# Patient Record
Sex: FEMALE | Race: WHITE
[De-identification: names, ages, dates, MRNs, and addresses within clinical notes are randomized per-mention and may not be internally consistent; named-entity substitution may affect disease eponyms.]

---

## 2017-02-19 ENCOUNTER — HOSPITAL ENCOUNTER (EMERGENCY)
Dept: HOSPITAL 53 - M ED | Age: 22
Discharge: HOME | End: 2017-02-19
Payer: OTHER GOVERNMENT

## 2017-02-19 DIAGNOSIS — F17.200: ICD-10-CM

## 2017-02-19 DIAGNOSIS — R19.7: ICD-10-CM

## 2017-02-19 DIAGNOSIS — Z79.899: ICD-10-CM

## 2017-02-19 DIAGNOSIS — Z79.51: ICD-10-CM

## 2017-02-19 DIAGNOSIS — R11.2: Primary | ICD-10-CM

## 2017-02-19 DIAGNOSIS — J45.909: ICD-10-CM

## 2017-02-19 DIAGNOSIS — G43.909: ICD-10-CM

## 2017-02-19 LAB
ALBUMIN SERPL BCG-MCNC: 3.9 GM/DL (ref 3.2–5.2)
ALBUMIN/GLOB SERPL: 1 {RATIO} (ref 1–1.93)
ALP SERPL-CCNC: 121 U/L (ref 45–117)
ALT SERPL W P-5'-P-CCNC: 26 U/L (ref 12–78)
AMYLASE SERPL-CCNC: 39 U/L (ref 25–115)
ANION GAP SERPL CALC-SCNC: 12 MEQ/L (ref 8–16)
AST SERPL-CCNC: 13 U/L (ref 15–37)
BILIRUB CONJ SERPL-MCNC: 0.1 MG/DL (ref 0–0.2)
BILIRUB SERPL-MCNC: 0.5 MG/DL (ref 0.2–1)
BUN SERPL-MCNC: 16 MG/DL (ref 7–18)
CALCIUM SERPL-MCNC: 9.4 MG/DL (ref 8.5–10.1)
CHLORIDE SERPL-SCNC: 104 MEQ/L (ref 98–107)
CO2 SERPL-SCNC: 25 MEQ/L (ref 21–32)
CONTROL LINE HCG: (no result)
CREAT SERPL-MCNC: 0.92 MG/DL (ref 0.55–1.02)
ERYTHROCYTE [DISTWIDTH] IN BLOOD BY AUTOMATED COUNT: 14.7 % (ref 11.5–14.5)
GLUCOSE SERPL-MCNC: 154 MG/DL (ref 70–105)
MCH RBC QN AUTO: 27 PG (ref 27–33)
MCHC RBC AUTO-ENTMCNC: 32.9 G/DL (ref 32–36.5)
MCV RBC AUTO: 81.9 FL (ref 80–96)
NEUTS BAND NFR BLD: 2 % (ref ?–11)
PLATELET # BLD AUTO: 249 K/MM3 (ref 150–450)
POTASSIUM SERPL-SCNC: 4 MEQ/L (ref 3.5–5.1)
PROT SERPL-MCNC: 7.8 GM/DL (ref 6.4–8.2)
RBC MORPH BLD: NORMAL
SODIUM SERPL-SCNC: 141 MEQ/L (ref 136–145)
WBC # BLD AUTO: 18.2 K/MM3 (ref 4–10)

## 2017-02-19 PROCEDURE — 83690 ASSAY OF LIPASE: CPT

## 2017-02-19 PROCEDURE — 96361 HYDRATE IV INFUSION ADD-ON: CPT

## 2017-02-19 PROCEDURE — 96374 THER/PROPH/DIAG INJ IV PUSH: CPT

## 2017-02-19 PROCEDURE — 36415 COLL VENOUS BLD VENIPUNCTURE: CPT

## 2017-02-19 PROCEDURE — 82150 ASSAY OF AMYLASE: CPT

## 2017-02-19 PROCEDURE — 84703 CHORIONIC GONADOTROPIN ASSAY: CPT

## 2017-02-19 PROCEDURE — 80076 HEPATIC FUNCTION PANEL: CPT

## 2017-02-19 PROCEDURE — 99284 EMERGENCY DEPT VISIT MOD MDM: CPT

## 2017-02-19 PROCEDURE — 80048 BASIC METABOLIC PNL TOTAL CA: CPT

## 2017-02-19 PROCEDURE — 85025 COMPLETE CBC W/AUTO DIFF WBC: CPT

## 2017-02-19 NOTE — EDDOCDS
Nurse's Notes                                                                                     

[facility]                                                                         

Name: Alejandra Kapadia                                                                                  

Age: 21 yrs                                                                                       

Sex: Female                                                                                       

: 1995                                                                                   

MRN: [id number]                                                                                     

Arrival Date: 2017                                                                          

Time: 03:05                                                                                       

Account#: [id number]                                                                              

Bed I2 / M2                                                                                       

Private MD:                                                                                       

Diagnosis: Nausea and vomiting;Diarrhea, unspecified                                              

                                                                                                  

Presentation:                                                                                     

                                                                                             

03:19 Presenting complaint: Patient states: she has had vomiting and diarrhea since 2100 last cz  

      night. Adult Sepsis Screening: The patient does not have new or worsening altered           

      mentation. Patient's respiratory rate is less than 22. Systolic blood pressure is           

      greater than 100. Patient has a qSOFA score of 0- Negative Sepsis Screen.                   

      Suicide/Homicide risk assessment- the patient denies having any suicidal and/or             

      homicidal ideations and does not present with any other emotional, behavioral or mental     

      health complaints.  Status: Patient is not a  or              

      dependent. Transition of care: patient was not received from another setting of care.       

03:19 Acuity: DARLENE Level 3                                                                     cz  

03:19 Method Of Arrival: Wheelchair                                                           cz  

                                                                                                  

Triage Assessment:                                                                                

03:21 General: Appears uncomfortable. Pain: Location: abdomen Pain currently is 4 out of 10   cz  

      on a pain scale. At worst was 8 out of 10 on a pain scale. HIV screening NA for this        

      visit Offered previously.                                                                   

                                                                                                  

OB/GYN:                                                                                           

03:21 LMP 2017                                                                           cz  

                                                                                                  

Historical:                                                                                       

- Allergies: No known drug Allergies;                                                             

- Home Meds:                                                                                      

1. Advair Diskus 250-50 mcg/dose Inhl dsdv 1 puff 2 times per day                               

2. Albuterol Inhl 2 puffs every 4 hours as needed                                               

3. fluticasone nasal spray 2 sprays each nostril daily                                          

4. loratadine 10 mg Oral tab 1 tab once daily                                                   

5. omeprazole 20 mg Oral cpDR 1 cap once daily                                                  

6. Singulair 10 mg Oral tab 1 tab once daily                                                    

- PMHx: Asthma; Migraine Headaches; Seasonal Allergies;                                           

- PSHx: wisdom teeth extraction;                                                                  

- Social history: Smoking status: Patient uses tobacco products, light tobacco smoker.            

No barriers to communication noted, The patient speaks fluent English, Speaks                   

appropriately for age.                                                                          

- Family history: Significant other has/had recent gastrointestinal symptoms.                     

- : The pt / caregiver states he / she is not on anticoagulants. Home medication list             

is obtained from the patient.                                                                   

- Exposure Risk Screening:: None identified.                                                      

                                                                                                  

                                                                                                  

Screenin:25 Screening information is obtained from the patient. Fall risk: No risks identified.     dls 

      Assistance ADL's: requires no assistance with activities of daily living. Abuse/DV          

      Screen: The patient / caregiver reports he/she is: not in a situation that causes fear,     

      pain or injury. Nutritional screening: No deficits noted. Advance Directives:               

      Currently, there is no health care proxy. There is no active DNR order. There is no         

      living will. There is no Power of . Advance directive information has not           

      previously been placed in an [facility] medical record. home support is adequate.                  

                                                                                                  

Assessment:                                                                                       

05:23 General: Appears distressed, uncomfortable, well developed, well nourished, Behavior is dls 

      cooperative. Neurological: No deficits noted. EENT: No deficits noted. Cardiovascular:      

      No deficits noted. Respiratory: No deficits noted. GI: Abdomen is non- distended Bowel      

      sounds present X 4 quads. Abd is soft X 4 quads Reports diarrhea, lower abdominal pain,     

      nausea, vomiting. : No deficits noted. Derm: No deficits noted. Musculoskeletal: No       

      deficits noted.                                                                             

                                                                                                  

Vital Signs:                                                                                      

03:21  / 77; Pulse 133; Resp 16; Temp 98.5(TE); Pulse Ox 98% on R/A; Weight 90.72 kg;   cz  

      Height 5 ft. 8 in. (172.72 cm);                                                             

06:30  / 69; Pulse 106; Resp 18; Temp 97.8(O); Pulse Ox 99% on R/A; Pain 0/10;          daphne 

03:21 Body Mass Index 30.41 (90.72 kg, 172.72 cm)                                               

                                                                                                  

Vitals:                                                                                           

03:21 Log In Time: 2017 at 03:05.                                                  

                                                                                                  

ED Course:                                                                                        

03:07 Patient visited by Naty Schrader.                                                      gjb 

03:07 Patient moved to Waiting                                                                gjb 

03:20 Triage Initiated                                                                        cz  

03:25 Patient moved to Pre RCE                                                                cz  

05:03 Patient moved to I2 / M2                                                                cln 

05:25 The patient / caregiver is instructed regarding the plan of care and ED course.         dls 

      Accompanied by Significant Other, Patient has correct armband on for positive               

      identification. Placed in gown. Bed in low position. Call light in reach.                   

05:25 Inserted saline lock: 20 gauge in right antecubital area and blood collected. The       dls 

      patient tolerated the procedure well. No procedures done that require assistance. Labs      

      drawn. (by ED staff).                                                                       

05:31 Patient visited by Jessy Campo RN.                                                    dls 

05:48 PLATELET ESTIMATE Sent.                                                                 dls 

05:48 DIFFERENTIAL NO CHARGE Sent.                                                            dls 

06:19 [facility] Payment Agreement was scanned into [employer] and attached to record.               pm4 

06:30 Patient visited by Linn Srinivasan PCA.                                                 daphne 

06:59 Jonathan Ortega PA is PHCP.                                                         btw 

06:59 Victor Hugo Morataya DO is Attending Physician.                                            btw 

06:59 Patient visited by Jonathan Ortega PA.                                              btw 

07:14 Discontinued IV lock intact, bleeding controlled, pressure dressing applied, No         dls 

      redness/swelling at site.                                                                   

                                                                                                  

Administered Medications:                                                                         

05:21 Drug: NS 0.9% 1000 ml [sodium chloride 0.9 % intravenous solution] Route: IV; Rate:     dls 

      bolus; Site: right antecubital;                                                             

07:13 Follow up: IV Status: Completed infusion                                                dls 

05:22 Drug: Ondansetron 4 mg [ondansetron HCl 2 mg/mL intravenous solution (2 mL)] Route:     dls 

      IVP; Site: right antecubital;                                                               

07:13 Follow up: Response: Nausea is decreased                                                dls 

                                                                                                  

                                                                                                  

Order Results:                                                                                    

Lab Order: Amylase; SPEC'M 17 05:16                                                         

      Test: AMYLASE; Value: 39; Range: ; Units: U/L; Status: F                              

Lab Order: Basic Metabolic Profile; SPEC'M 17 05:16                                         

      Test: GLUCOSE, FASTING; Value: 154; Range: ; Abnormal: Above high normal; Units:      

      MG/DL; Status: F                                                                            

      Test: BLOOD UREA NITROGEN; Value: 16; Range: 7-18; Units: MG/DL; Status: F                  

      Test: CREATININE FOR GFR; Value: 0.92; Range: 0.55-1.02; Units: MG/DL; Status: F            

      Test: SODIUM LEVEL; Value: 141; Range: 136-145; Units: MEQ/L; Status: F                     

      Test: POTASSIUM SERUM; Value: 4.0; Range: 3.5-5.1; Units: MEQ/L; Status: F                  

      Test: CHLORIDE LEVEL; Value: 104; Range: ; Units: MEQ/L; Status: F                    

      Test: CARBON DIOXIDE LEVEL; Value: 25; Range: 21-32; Units: MEQ/L; Status: F                

      Test: ANION GAP; Value: 12; Range: 8-16; Units: MEQ/L; Status: F                            

      Test: CALCIUM LEVEL; Value: 9.4; Range: 8.5-10.1; Units: MG/DL; Status: F                   

Lab Order: CBC with Diff; SPEC'M 17 05:16                                                   

      Test: WHITE BLOOD COUNT; Value: 18.2; Range: 4.0-10.0; Abnormal: Above high normal;         

      Units: K/mm3; Status: F                                                                     

      Test: RED BLOOD COUNT; Value: 5.95; Range: 4.00-5.40; Abnormal: Above high normal;          

      Units: M/mm3; Status: F                                                                     

      Test: HEMOGLOBIN; Value: 16.1; Range: 12.0-16.0; Abnormal: Above high normal; Units:        

      g/dl; Status: F                                                                             

      Test: HEMATOCRIT; Value: 48.7; Range: 36.0-47.0; Abnormal: Above high normal; Units: %;     

      Status: F                                                                                   

      Test: MEAN CORPUSCULAR VOLUME; Value: 81.9; Range: 80.0-96.0; Units: fl; Status: F          

      Test: MEAN CORPUSCULAR HEMOGLOBIN; Value: 27.0; Range: 27.0-33.0; Units: pg; Status: F      

      Test: MEAN CORPUSCULAR HGB CONC; Value: 32.9; Range: 32.0-36.5; Units: g/dl; Status: F      

      Test: RED CELL DISTRIBUTION WIDTH; Value: 14.7; Range: 11.5-14.5; Abnormal: Above high      

      normal; Units: %; Status: F                                                                 

      Test: PLATELET COUNT, AUTOMATED; Value: 249; Range: 150-450; Units: k/mm3; Status: F        

      Test: NEUTROPHILS; Value: 92; Range: 35-75; Abnormal: Above high normal; Units: %;          

      Status: F                                                                                   

      Test: BANDS; Value: 2; Range: < 11; Units: %; Status: F                                     

      Test: LYMPHOCYTES; Value: 1; Range: 16-52; Abnormal: Below low normal; Units: %;            

      Status: F                                                                                   

      Test: MONOCYTES; Value: 5; Range: 0-8; Units: %; Status: F                                  

      Test: RBC MORPHOLOGY; Value: NORMAL; Status: F                                              

Lab Order: HCG,Serum Qualitative; SPEC'M 17 05:16                                           

      Test: HCG, SERUM QUALITATIVE; Value: NEGATIVE; Range: NEGATIVE; Status: F                   

Lab Order: Lipase; SPEC'M 17 05:16                                                          

      Test: LIPASE; Value: 78; Range: ; Units: U/L; Status: F                               

Lab Order: Liver Profile; SPEC'M 17 05:16                                                   

      Test: AST/SGOT; Value: 13; Range: 15-37; Abnormal: Below low normal; Units: U/L;            

      Status: F                                                                                   

      Test: ALT/SGPT; Value: 26; Range: 12-78; Units: U/L; Status: F                              

      Test: ALKALINE PHOSPHATASE; Value: 121; Range: ; Abnormal: Above high normal;         

      Units: U/L; Status: F                                                                       

      Test: BILIRUBIN,TOTAL; Value: 0.5; Range: 0.2-1.0; Units: MG/DL; Status: F                  

      Test: BILIRUBIN,DIRECT; Value: 0.1; Range: 0.0-0.2; Units: MG/DL; Status: F                 

      Test: TOTAL PROTEIN; Value: 7.8; Range: 6.4-8.2; Units: GM/DL; Status: F                    

      Test: ALBUMIN; Value: 3.9; Range: 3.2-5.2; Units: GM/DL; Status: F                          

      Test: ALBUMIN/GLOBULIN RATIO; Value: 1.00; Range: 1.00-1.93; Status: F                      

Lab Order: PLATELET ESTIMATE; SPECM 17 05:16                                               

      Test: PLATELET ESTIMATE; Value: NORMAL; Range: NORMAL; Status: F                            

                                                                                                  

Outcome:                                                                                          

07:05 Discharge ordered by Provider.                                                          btw 

07:14 Discharge Assessment: Patient awake, alert and oriented x 3. No cognitive and/or        dls 

      functional deficits noted. Patient verbalized understanding of disposition                  

      instructions. patient administered narcotics - no. The following High Risk Discharge        

      criteria are identified: None. Discharged to home ambulatory, with significant other.       

      Condition: stable Condition: improved. Discharge instructions given to patient,             

      Instructed on discharge instructions, follow up and referral plans. medication usage,       

      Demonstrated understanding of instructions, medications, Pt was receptive of discharge      

      instructions/ teaching. Prescriptions given X 1. No special radiology studies were          

      completed. Property sent home with patient.                                                 

07:15 Patient left the ED.                                                                    dls 

                                                                                                  

Signatures:                                                                                       

Jessy Campo, RN                        RN   dls                                                  

Leon Stephen, CHRISTEN                      RN   cz                                                   

Jonathan Ortega PA PA   btw                                                  

Zarina, Linn, PCA                     PCA  Naty Peter, Rachele, PCA                   PCA  cln                                                  

Kody Erazo, Reg                     Reg  pm4                                                  

                                                                                                  

**************************************************************************************************

MTDD

## 2017-02-19 NOTE — EDDOCDS
Physician Documentation                                                                           

[facility]                                                                         

Name: Alejandra Kapadia                                                                                  

Age: 21 yrs                                                                                       

Sex: Female                                                                                       

: 1995                                                                                   

MRN: [id number]                                                                                     

Arrival Date: 2017                                                                          

Time: 03:05                                                                                       

Account#: [id number]                                                                              

Bed I2 / M2                                                                                       

Private MD:                                                                                       

Disposition:                                                                                      

17 07:05 Discharged to Home/Self Care. Impression: Nausea and vomiting, Diarrhea,           

unspecified.                                                                                    

- Condition is Stable.                                                                            

- Discharge Instructions: Viral Gastroenteritis, Easy-to-Read.                                    

- Prescriptions for ZOFRAN ODT 4 mg - dissolve 1 tablet by ORAL route 4 times per day             

As needed do not chew, do not swallow whole; 10 tablet.                                         

- Medication Reconciliation, Local Pharmacy Hours form.                                           

- Follow up: Private Physician; When: Call to arrange an appointment; Reason: Further             

diagnostic work-up, Recheck today's complaints, Continuance of care.                            

- Problem is new.                                                                                 

- Symptoms are unchanged.                                                                         

                                                                                                  

                                                                                                  

                                                                                                  

Historical:                                                                                       

- Allergies: No known drug Allergies;                                                             

- Home Meds:                                                                                      

1. Advair Diskus 250-50 mcg/dose Inhl dsdv 1 puff 2 times per day                               

2. Albuterol Inhl 2 puffs every 4 hours as needed                                               

3. fluticasone nasal spray 2 sprays each nostril daily                                          

4. loratadine 10 mg Oral tab 1 tab once daily                                                   

5. omeprazole 20 mg Oral cpDR 1 cap once daily                                                  

6. Singulair 10 mg Oral tab 1 tab once daily                                                    

- PMHx: Asthma; Migraine Headaches; Seasonal Allergies;                                           

- PSHx: wisdom teeth extraction;                                                                  

- Social history: Smoking status: Patient uses tobacco products, light tobacco smoker.            

No barriers to communication noted, The patient speaks fluent English, Speaks                   

appropriately for age.                                                                          

- Family history: Significant other has/had recent gastrointestinal symptoms.                     

- : The pt / caregiver states he / she is not on anticoagulants. Home medication list             

is obtained from the patient.                                                                   

- Exposure Risk Screening:: None identified.                                                      

                                                                                                  

                                                                                                  

OB/GYN:                                                                                           

                                                                                             

03:21 LMP 2017                                                                           cz  

                                                                                                  

Vital Signs:                                                                                      

03:21  / 77; Pulse 133; Resp 16; Temp 98.5(TE); Pulse Ox 98% on R/A; Weight 90.72 kg /  cz  

      200 lbs; Height 5 ft. 8 in. (172.72 cm);                                                    

06:30  / 69; Pulse 106; Resp 18; Temp 97.8(O); Pulse Ox 99% on R/A; Pain 0/10;          daphne 

03:21 Body Mass Index 30.41 (90.72 kg, 172.72 cm)                                             cz  

                                                                                                  

MDM:                                                                                              

05:04 NS 0.9% 1000 ml IV at bolus once ordered.                                               mm11

05:04 IV Saline Lock ordered.                                                                 mm11

05:04 Undress patient appropriately for examination ordered.                                  mm11

05:05 Amylase Ordered.                                                                        EDMS

05:05 Basic Metabolic Profile Ordered.                                                        EDMS

05:05 CBC with Diff Ordered.                                                                  EDMS

05:05 HCG,Serum Qualitative Ordered.                                                          EDMS

05:05 Lipase Ordered.                                                                         EDMS

05:05 Liver Profile Ordered.                                                                  EDMS

05:05 NOTHING BY MOUTH+DIET ordered.                                                          EDMS

05:22 Ondansetron 4 mg IVP once ordered.                                                      dls 

05:47 DIFFERENTIAL NO CHARGE Ordered.                                                         EDMS

05:47 PLATELET ESTIMATE Ordered.                                                              EDMS

06:19 [facility] Payment Agreement was scanned into [employer] and attached to record.               pm4 

06:59 Financial registration complete.                                                        pm4 

                                                                                                  

Administered Medications:                                                                         

05:21 Drug: NS 0.9% 1000 ml [sodium chloride 0.9 % intravenous solution] Route: IV; Rate:     dls 

      bolus; Site: right antecubital;                                                             

07:13 Follow up: IV Status: Completed infusion                                                dls 

05:22 Drug: Ondansetron 4 mg [ondansetron HCl 2 mg/mL intravenous solution (2 mL)] Route:     dls 

      IVP; Site: right antecubital;                                                               

07:13 Follow up: Response: Nausea is decreased                                                dls 

                                                                                                  

                                                                                                  

Signatures:                                                                                       

Dispatcher [employer]                           EDJessy Decker RN RN dls Zecher, Calvin, RN RN cz Maynard, Matthew, DO                    DO   mm11                                                 

Jonathan Ortega PA                  PA   btw                                                  

Kody Erazo, Reg                     Reg  pm4                                                  

                                                                                                  

The chart was reviewed and I authenticate all verbal orders and agree with the evaluation and 
treatment provided.Attachments:

06:19 [facility] Payment Agreement                                                                pm4 

                                                                                                  

**************************************************************************************************

[facility]

## 2017-02-21 NOTE — EDDOCDS
Physician Documentation                                                                           

[facility]                                                                         

Name: Alejandra Kapadia                                                                                  

Age: 21 yrs                                                                                       

Sex: Female                                                                                       

: 1995                                                                                   

MRN: [id number]                                                                                     

Arrival Date: 2017                                                                          

Time: 03:05                                                                                       

Account#: [id number]                                                                              

Bed I2 / M2                                                                                       

Private MD:                                                                                       

Disposition:                                                                                      

17 07:05 Discharged to Home/Self Care. Impression: Nausea and vomiting, Diarrhea,           

unspecified.                                                                                    

- Condition is Stable.                                                                            

- Discharge Instructions: Viral Gastroenteritis, Easy-to-Read.                                    

- Prescriptions for ZOFRAN ODT 4 mg - dissolve 1 tablet by ORAL route 4 times per day             

As needed do not chew, do not swallow whole; 10 tablet.                                         

- Medication Reconciliation, Local Pharmacy Hours form.                                           

- Follow up: Private Physician; When: Call to arrange an appointment; Reason: Further             

diagnostic work-up, Recheck today's complaints, Continuance of care.                            

- Problem is new.                                                                                 

- Symptoms are unchanged.                                                                         

                                                                                                  

                                                                                                  

                                                                                                  

Historical:                                                                                       

- Allergies: No known drug Allergies;                                                             

- Home Meds:                                                                                      

1. Advair Diskus 250-50 mcg/dose Inhl dsdv 1 puff 2 times per day                               

2. Albuterol Inhl 2 puffs every 4 hours as needed                                               

3. fluticasone nasal spray 2 sprays each nostril daily                                          

4. loratadine 10 mg Oral tab 1 tab once daily                                                   

5. omeprazole 20 mg Oral cpDR 1 cap once daily                                                  

6. Singulair 10 mg Oral tab 1 tab once daily                                                    

- PMHx: Asthma; Migraine Headaches; Seasonal Allergies;                                           

- PSHx: wisdom teeth extraction;                                                                  

- Social history: Smoking status: Patient uses tobacco products, light tobacco smoker.            

No barriers to communication noted, The patient speaks fluent English, Speaks                   

appropriately for age.                                                                          

- Family history: Significant other has/had recent gastrointestinal symptoms.                     

- : The pt / caregiver states he / she is not on anticoagulants. Home medication list             

is obtained from the patient.                                                                   

- Exposure Risk Screening:: None identified.                                                      

                                                                                                  

                                                                                                  

OB/GYN:                                                                                           

                                                                                             

03:21 LMP 2017                                                                           cz  

                                                                                                  

Vital Signs:                                                                                      

03:21  / 77; Pulse 133; Resp 16; Temp 98.5(TE); Pulse Ox 98% on R/A; Weight 90.72 kg /  cz  

      200 lbs; Height 5 ft. 8 in. (172.72 cm);                                                    

06:30  / 69; Pulse 106; Resp 18; Temp 97.8(O); Pulse Ox 99% on R/A; Pain 0/10;          daphne 

03:21 Body Mass Index 30.41 (90.72 kg, 172.72 cm)                                             cz  

                                                                                                  

MDM:                                                                                              

05:04 NS 0.9% 1000 ml IV at bolus once ordered.                                               mm11

05:04 IV Saline Lock ordered.                                                                 mm11

05:04 Undress patient appropriately for examination ordered.                                  mm11

05:05 Amylase Ordered.                                                                        EDMS

05:05 Basic Metabolic Profile Ordered.                                                        EDMS

05:05 CBC with Diff Ordered.                                                                  EDMS

05:05 HCG,Serum Qualitative Ordered.                                                          EDMS

05:05 Lipase Ordered.                                                                         EDMS

05:05 Liver Profile Ordered.                                                                  EDMS

05:05 NOTHING BY MOUTH+DIET ordered.                                                          EDMS

05:22 Ondansetron 4 mg IVP once ordered.                                                      dls 

05:47 DIFFERENTIAL NO CHARGE Ordered.                                                         EDMS

05:47 PLATELET ESTIMATE Ordered.                                                              EDMS

06:19 [facility] Payment Agreement was scanned into [employer] and attached to record.               pm4 

06:59 Financial registration complete.                                                        pm4 

22:10 T-Sheet-- Draft Copy was scanned into [employer] and attached to record.                   klr 

                                                                                                  

Administered Medications:                                                                         

05:21 Drug: NS 0.9% 1000 ml [sodium chloride 0.9 % intravenous solution] Route: IV; Rate:     dls 

      bolus; Site: right antecubital;                                                             

07:13 Follow up: IV Status: Completed infusion                                                dls 

05:22 Drug: Ondansetron 4 mg [ondansetron HCl 2 mg/mL intravenous solution (2 mL)] Route:     dls 

      IVP; Site: right antecubital;                                                               

07:13 Follow up: Response: Nausea is decreased                                                dls 

                                                                                                  

                                                                                                  

Signatures:                                                                                       

Dispatcher [employer]                           EDJessy Decker RN RN dls Zecher, Calvin, RN RN cz Maynard, Matthew, DO                    DO   mm11                                                 

Jonathan Ortega PA PA btw Redder, Kathie                               klr                                                  

Kody Erazo, Reg                     Reg  pm4                                                  

                                                                                                  

The chart was reviewed and I authenticate all verbal orders and agree with the evaluation and 
treatment provided.Attachments:

06:19 [facility] Payment Agreement                                                                pm4 

22:10 T-Sheet-- Draft Copy                                                                    klr 

                                                                                                  

**************************************************************************************************



*** Chart Complete ***
MTDD

## 2017-02-21 NOTE — EDDOCDS
Nurse's Notes                                                                                     

[facility]                                                                         

Name: Alejandra Kapadia                                                                                  

Age: 21 yrs                                                                                       

Sex: Female                                                                                       

: 1995                                                                                   

MRN: [id number]                                                                                     

Arrival Date: 2017                                                                          

Time: 03:05                                                                                       

Account#: [id number]                                                                              

Bed I2 / M2                                                                                       

Private MD:                                                                                       

Diagnosis: Nausea and vomiting;Diarrhea, unspecified                                              

                                                                                                  

Presentation:                                                                                     

                                                                                             

03:19 Presenting complaint: Patient states: she has had vomiting and diarrhea since 2100 last cz  

      night. Adult Sepsis Screening: The patient does not have new or worsening altered           

      mentation. Patient's respiratory rate is less than 22. Systolic blood pressure is           

      greater than 100. Patient has a qSOFA score of 0- Negative Sepsis Screen.                   

      Suicide/Homicide risk assessment- the patient denies having any suicidal and/or             

      homicidal ideations and does not present with any other emotional, behavioral or mental     

      health complaints.  Status: Patient is not a  or              

      dependent. Transition of care: patient was not received from another setting of care.       

03:19 Acuity: DARLENE Level 3                                                                     cz  

03:19 Method Of Arrival: Wheelchair                                                           cz  

                                                                                                  

Triage Assessment:                                                                                

03:21 General: Appears uncomfortable. Pain: Location: abdomen Pain currently is 4 out of 10   cz  

      on a pain scale. At worst was 8 out of 10 on a pain scale. HIV screening NA for this        

      visit Offered previously.                                                                   

                                                                                                  

OB/GYN:                                                                                           

03:21 LMP 2017                                                                           cz  

                                                                                                  

Historical:                                                                                       

- Allergies: No known drug Allergies;                                                             

- Home Meds:                                                                                      

1. Advair Diskus 250-50 mcg/dose Inhl dsdv 1 puff 2 times per day                               

2. Albuterol Inhl 2 puffs every 4 hours as needed                                               

3. fluticasone nasal spray 2 sprays each nostril daily                                          

4. loratadine 10 mg Oral tab 1 tab once daily                                                   

5. omeprazole 20 mg Oral cpDR 1 cap once daily                                                  

6. Singulair 10 mg Oral tab 1 tab once daily                                                    

- PMHx: Asthma; Migraine Headaches; Seasonal Allergies;                                           

- PSHx: wisdom teeth extraction;                                                                  

- Social history: Smoking status: Patient uses tobacco products, light tobacco smoker.            

No barriers to communication noted, The patient speaks fluent English, Speaks                   

appropriately for age.                                                                          

- Family history: Significant other has/had recent gastrointestinal symptoms.                     

- : The pt / caregiver states he / she is not on anticoagulants. Home medication list             

is obtained from the patient.                                                                   

- Exposure Risk Screening:: None identified.                                                      

                                                                                                  

                                                                                                  

Screenin:25 Screening information is obtained from the patient. Fall risk: No risks identified.     dls 

      Assistance ADL's: requires no assistance with activities of daily living. Abuse/DV          

      Screen: The patient / caregiver reports he/she is: not in a situation that causes fear,     

      pain or injury. Nutritional screening: No deficits noted. Advance Directives:               

      Currently, there is no health care proxy. There is no active DNR order. There is no         

      living will. There is no Power of . Advance directive information has not           

      previously been placed in an [facility] medical record. home support is adequate.                  

                                                                                                  

Assessment:                                                                                       

05:23 General: Appears distressed, uncomfortable, well developed, well nourished, Behavior is dls 

      cooperative. Neurological: No deficits noted. EENT: No deficits noted. Cardiovascular:      

      No deficits noted. Respiratory: No deficits noted. GI: Abdomen is non- distended Bowel      

      sounds present X 4 quads. Abd is soft X 4 quads Reports diarrhea, lower abdominal pain,     

      nausea, vomiting. : No deficits noted. Derm: No deficits noted. Musculoskeletal: No       

      deficits noted.                                                                             

                                                                                                  

Vital Signs:                                                                                      

03:21  / 77; Pulse 133; Resp 16; Temp 98.5(TE); Pulse Ox 98% on R/A; Weight 90.72 kg;   cz  

      Height 5 ft. 8 in. (172.72 cm);                                                             

06:30  / 69; Pulse 106; Resp 18; Temp 97.8(O); Pulse Ox 99% on R/A; Pain 0/10;          daphne 

03:21 Body Mass Index 30.41 (90.72 kg, 172.72 cm)                                               

                                                                                                  

Vitals:                                                                                           

03:21 Log In Time: 2017 at 03:05.                                                  

                                                                                                  

ED Course:                                                                                        

03:07 Patient visited by Naty Schrader.                                                      gjb 

03:07 Patient moved to Waiting                                                                gjb 

03:20 Triage Initiated                                                                        cz  

03:25 Patient moved to Pre RCE                                                                cz  

05:03 Patient moved to I2 / M2                                                                cln 

05:25 The patient / caregiver is instructed regarding the plan of care and ED course.         dls 

      Accompanied by Significant Other, Patient has correct armband on for positive               

      identification. Placed in gown. Bed in low position. Call light in reach.                   

05:25 Inserted saline lock: 20 gauge in right antecubital area and blood collected. The       dls 

      patient tolerated the procedure well. No procedures done that require assistance. Labs      

      drawn. (by ED staff).                                                                       

05:31 Patient visited by Jessy Campo RN.                                                    dls 

05:48 PLATELET ESTIMATE Sent.                                                                 dls 

05:48 DIFFERENTIAL NO CHARGE Sent.                                                            dls 

06:19 [facility] Payment Agreement was scanned into [facility] and attached to record.               pm4 

06:30 Patient visited by Linn Srinivasan PCA.                                                 daphne 

06:59 Jonathan Ortega PA is PHCP.                                                         btw 

06:59 Victor Hugo Morataya DO is Attending Physician.                                            btw 

06:59 Patient visited by Jonathan Ortega PA.                                              btw 

07:14 Discontinued IV lock intact, bleeding controlled, pressure dressing applied, No         dls 

      redness/swelling at site.                                                                   

22:10 T-Sheet-- Draft Copy was scanned into [facility] and attached to record.                   klr 

                                                                                                  

Administered Medications:                                                                         

05:21 Drug: NS 0.9% 1000 ml [sodium chloride 0.9 % intravenous solution] Route: IV; Rate:     dls 

      bolus; Site: right antecubital;                                                             

07:13 Follow up: IV Status: Completed infusion                                                dls 

05:22 Drug: Ondansetron 4 mg [ondansetron HCl 2 mg/mL intravenous solution (2 mL)] Route:     dls 

      IVP; Site: right antecubital;                                                               

07:13 Follow up: Response: Nausea is decreased                                                dls 

                                                                                                  

                                                                                                  

Order Results:                                                                                    

Lab Order: Amylase; SPEC'M 17 05:16                                                         

      Test: AMYLASE; Value: 39; Range: ; Units: U/L; Status: F                              

Lab Order: Basic Metabolic Profile; SPEC'M 17 05:16                                         

      Test: GLUCOSE, FASTING; Value: 154; Range: ; Abnormal: Above high normal; Units:      

      MG/DL; Status: F                                                                            

      Test: BLOOD UREA NITROGEN; Value: 16; Range: 7-18; Units: MG/DL; Status: F                  

      Test: CREATININE FOR GFR; Value: 0.92; Range: 0.55-1.02; Units: MG/DL; Status: F            

      Test: SODIUM LEVEL; Value: 141; Range: 136-145; Units: MEQ/L; Status: F                     

      Test: POTASSIUM SERUM; Value: 4.0; Range: 3.5-5.1; Units: MEQ/L; Status: F                  

      Test: CHLORIDE LEVEL; Value: 104; Range: ; Units: MEQ/L; Status: F                    

      Test: CARBON DIOXIDE LEVEL; Value: 25; Range: 21-32; Units: MEQ/L; Status: F                

      Test: ANION GAP; Value: 12; Range: 8-16; Units: MEQ/L; Status: F                            

      Test: CALCIUM LEVEL; Value: 9.4; Range: 8.5-10.1; Units: MG/DL; Status: F                   

Lab Order: CBC with Diff; SPEC'M 17 05:16                                                   

      Test: WHITE BLOOD COUNT; Value: 18.2; Range: 4.0-10.0; Abnormal: Above high normal;         

      Units: K/mm3; Status: F                                                                     

      Test: RED BLOOD COUNT; Value: 5.95; Range: 4.00-5.40; Abnormal: Above high normal;          

      Units: M/mm3; Status: F                                                                     

      Test: HEMOGLOBIN; Value: 16.1; Range: 12.0-16.0; Abnormal: Above high normal; Units:        

      g/dl; Status: F                                                                             

      Test: HEMATOCRIT; Value: 48.7; Range: 36.0-47.0; Abnormal: Above high normal; Units: %;     

      Status: F                                                                                   

      Test: MEAN CORPUSCULAR VOLUME; Value: 81.9; Range: 80.0-96.0; Units: fl; Status: F          

      Test: MEAN CORPUSCULAR HEMOGLOBIN; Value: 27.0; Range: 27.0-33.0; Units: pg; Status: F      

      Test: MEAN CORPUSCULAR HGB CONC; Value: 32.9; Range: 32.0-36.5; Units: g/dl; Status: F      

      Test: RED CELL DISTRIBUTION WIDTH; Value: 14.7; Range: 11.5-14.5; Abnormal: Above high      

      normal; Units: %; Status: F                                                                 

      Test: PLATELET COUNT, AUTOMATED; Value: 249; Range: 150-450; Units: k/mm3; Status: F        

      Test: NEUTROPHILS; Value: 92; Range: 35-75; Abnormal: Above high normal; Units: %;          

      Status: F                                                                                   

      Test: BANDS; Value: 2; Range: < 11; Units: %; Status: F                                     

      Test: LYMPHOCYTES; Value: 1; Range: 16-52; Abnormal: Below low normal; Units: %;            

      Status: F                                                                                   

      Test: MONOCYTES; Value: 5; Range: 0-8; Units: %; Status: F                                  

      Test: RBC MORPHOLOGY; Value: NORMAL; Status: F                                              

Lab Order: HCG,Serum Qualitative; SPEC17 05:16                                           

      Test: HCG, SERUM QUALITATIVE; Value: NEGATIVE; Range: NEGATIVE; Status: F                   

Lab Order: Lipase; SPEC 17 05:16                                                          

      Test: LIPASE; Value: 78; Range: ; Units: U/L; Status: F                               

Lab Order: Liver Profile; SPEC 17 05:16                                                   

      Test: AST/SGOT; Value: 13; Range: 15-37; Abnormal: Below low normal; Units: U/L;            

      Status: F                                                                                   

      Test: ALT/SGPT; Value: 26; Range: 12-78; Units: U/L; Status: F                              

      Test: ALKALINE PHOSPHATASE; Value: 121; Range: ; Abnormal: Above high normal;         

      Units: U/L; Status: F                                                                       

      Test: BILIRUBIN,TOTAL; Value: 0.5; Range: 0.2-1.0; Units: MG/DL; Status: F                  

      Test: BILIRUBIN,DIRECT; Value: 0.1; Range: 0.0-0.2; Units: MG/DL; Status: F                 

      Test: TOTAL PROTEIN; Value: 7.8; Range: 6.4-8.2; Units: GM/DL; Status: F                    

      Test: ALBUMIN; Value: 3.9; Range: 3.2-5.2; Units: GM/DL; Status: F                          

      Test: ALBUMIN/GLOBULIN RATIO; Value: 1.00; Range: 1.00-1.93; Status: F                      

Lab Order: PLATELET ESTIMATE; SPEC17 05:16                                               

      Test: PLATELET ESTIMATE; Value: NORMAL; Range: NORMAL; Status: F                            

                                                                                                  

Outcome:                                                                                          

07:05 Discharge ordered by Provider.                                                          btw 

07:14 Discharge Assessment: Patient awake, alert and oriented x 3. No cognitive and/or        dls 

      functional deficits noted. Patient verbalized understanding of disposition                  

      instructions. patient administered narcotics - no. The following High Risk Discharge        

      criteria are identified: None. Discharged to home ambulatory, with significant other.       

      Condition: stable Condition: improved. Discharge instructions given to patient,             

      Instructed on discharge instructions, follow up and referral plans. medication usage,       

      Demonstrated understanding of instructions, medications, Pt was receptive of discharge      

      instructions/ teaching. Prescriptions given X 1. No special radiology studies were          

      completed. Property sent home with patient.                                                 

07:15 Patient left the ED.                                                                    dls 

                                                                                                  

Signatures:                                                                                       

Jessy Campo, RN                        RN   Leon Johnson RN                      RN   Jonathan Wilson PA PA btw                                                  

Zarina, Linn, PCA                     PCA  daphne                                                  

Beck, Naty menjivarb                                                  

Daren, Rachele, PCA                   PCA  Ashly Barrios Paul, Reg                     Reg  pm4                                                  

                                                                                                  

**************************************************************************************************



*** Chart Complete ***
MTDD

## 2017-03-07 ENCOUNTER — HOSPITAL ENCOUNTER (EMERGENCY)
Dept: HOSPITAL 53 - M ED | Age: 22
Discharge: HOME | End: 2017-03-07
Payer: OTHER GOVERNMENT

## 2017-03-07 VITALS — WEIGHT: 200 LBS | HEIGHT: 68 IN | BODY MASS INDEX: 30.31 KG/M2

## 2017-03-07 VITALS — DIASTOLIC BLOOD PRESSURE: 75 MMHG | SYSTOLIC BLOOD PRESSURE: 122 MMHG

## 2017-03-07 DIAGNOSIS — R11.2: Primary | ICD-10-CM

## 2017-03-07 DIAGNOSIS — Z87.891: ICD-10-CM

## 2017-03-07 LAB
ANION GAP SERPL CALC-SCNC: 6 MEQ/L (ref 8–16)
BASOPHILS # BLD AUTO: 0 K/MM3 (ref 0–0.2)
BASOPHILS NFR BLD AUTO: 0.9 % (ref 0–1)
BUN SERPL-MCNC: 7 MG/DL (ref 7–18)
CALCIUM SERPL-MCNC: 9.1 MG/DL (ref 8.5–10.1)
CHLORIDE SERPL-SCNC: 105 MEQ/L (ref 98–107)
CO2 SERPL-SCNC: 30 MEQ/L (ref 21–32)
CONTROL LINE HCG: (no result)
CREAT SERPL-MCNC: 0.63 MG/DL (ref 0.55–1.02)
EOSINOPHIL # BLD AUTO: 0.2 K/MM3 (ref 0–0.5)
EOSINOPHIL NFR BLD AUTO: 3.9 % (ref 0–3)
ERYTHROCYTE [DISTWIDTH] IN BLOOD BY AUTOMATED COUNT: 14.7 % (ref 11.5–14.5)
GLUCOSE SERPL-MCNC: 89 MG/DL (ref 70–105)
LARGE UNSTAINED CELL #: 0.1 K/MM3 (ref 0–0.4)
LARGE UNSTAINED CELL %: 1.5 % (ref 0–4)
LYMPHOCYTES # BLD AUTO: 1.9 K/MM3 (ref 1.5–6.5)
LYMPHOCYTES NFR BLD AUTO: 35.3 % (ref 24–44)
MCH RBC QN AUTO: 26.9 PG (ref 27–33)
MCHC RBC AUTO-ENTMCNC: 32.7 G/DL (ref 32–36.5)
MCV RBC AUTO: 82.4 FL (ref 80–96)
MONOCYTES # BLD AUTO: 0.3 K/MM3 (ref 0–0.8)
MONOCYTES NFR BLD AUTO: 6.5 % (ref 0–5)
NEUTROPHILS # BLD AUTO: 2.6 K/MM3 (ref 1.8–7.7)
NEUTROPHILS NFR BLD AUTO: 51.9 % (ref 36–66)
PLATELET # BLD AUTO: 225 K/MM3 (ref 150–450)
POTASSIUM SERPL-SCNC: 4 MEQ/L (ref 3.5–5.1)
SODIUM SERPL-SCNC: 141 MEQ/L (ref 136–145)
WBC # BLD AUTO: 5 K/MM3 (ref 4–10)

## 2017-05-18 ENCOUNTER — HOSPITAL ENCOUNTER (EMERGENCY)
Dept: HOSPITAL 53 - M ED | Age: 22
Discharge: HOME | End: 2017-05-18
Payer: OTHER GOVERNMENT

## 2017-05-18 VITALS — DIASTOLIC BLOOD PRESSURE: 75 MMHG | SYSTOLIC BLOOD PRESSURE: 154 MMHG

## 2017-05-18 VITALS — HEIGHT: 68 IN | BODY MASS INDEX: 29.4 KG/M2 | WEIGHT: 194 LBS

## 2017-05-18 DIAGNOSIS — Z79.51: ICD-10-CM

## 2017-05-18 DIAGNOSIS — J45.909: ICD-10-CM

## 2017-05-18 DIAGNOSIS — N83.8: ICD-10-CM

## 2017-05-18 DIAGNOSIS — E28.2: ICD-10-CM

## 2017-05-18 DIAGNOSIS — Z79.899: ICD-10-CM

## 2017-05-18 DIAGNOSIS — N83.292: Primary | ICD-10-CM

## 2017-05-18 DIAGNOSIS — N76.0: ICD-10-CM

## 2017-05-18 DIAGNOSIS — F90.9: ICD-10-CM

## 2017-05-18 DIAGNOSIS — K21.9: ICD-10-CM

## 2017-05-18 LAB
ALBUMIN SERPL BCG-MCNC: 3.4 GM/DL (ref 3.2–5.2)
ALBUMIN/GLOB SERPL: 1 {RATIO} (ref 1–1.93)
ALP SERPL-CCNC: 89 U/L (ref 45–117)
ALT SERPL W P-5'-P-CCNC: 17 U/L (ref 12–78)
AMYLASE SERPL-CCNC: 44 U/L (ref 25–115)
ANION GAP SERPL CALC-SCNC: 5 MEQ/L (ref 8–16)
AST SERPL-CCNC: 9 U/L (ref 15–37)
BASOPHILS # BLD AUTO: 0 K/MM3 (ref 0–0.2)
BASOPHILS NFR BLD AUTO: 0.7 % (ref 0–1)
BILIRUB CONJ SERPL-MCNC: < 0.1 MG/DL (ref 0–0.2)
BILIRUB SERPL-MCNC: 0.2 MG/DL (ref 0.2–1)
BUN SERPL-MCNC: 15 MG/DL (ref 7–18)
CALCIUM SERPL-MCNC: 8.8 MG/DL (ref 8.5–10.1)
CHLORIDE SERPL-SCNC: 108 MEQ/L (ref 98–107)
CO2 SERPL-SCNC: 29 MEQ/L (ref 21–32)
CONTROL LINE HCG: (no result)
CREAT SERPL-MCNC: 0.62 MG/DL (ref 0.55–1.02)
EOSINOPHIL # BLD AUTO: 0.2 K/MM3 (ref 0–0.5)
EOSINOPHIL NFR BLD AUTO: 2.9 % (ref 0–3)
ERYTHROCYTE [DISTWIDTH] IN BLOOD BY AUTOMATED COUNT: 14.5 % (ref 11.5–14.5)
GLUCOSE SERPL-MCNC: 69 MG/DL (ref 70–105)
INTERNAL CH/GC CONTROL: (no result)
LARGE UNSTAINED CELL #: 0.1 K/MM3 (ref 0–0.4)
LARGE UNSTAINED CELL %: 1.3 % (ref 0–4)
LYMPHOCYTES # BLD AUTO: 1.7 K/MM3 (ref 1.5–6.5)
LYMPHOCYTES NFR BLD AUTO: 23 % (ref 24–44)
MCH RBC QN AUTO: 28 PG (ref 27–33)
MCHC RBC AUTO-ENTMCNC: 32.8 G/DL (ref 32–36.5)
MCV RBC AUTO: 85.1 FL (ref 80–96)
MONOCYTES # BLD AUTO: 0.5 K/MM3 (ref 0–0.8)
MONOCYTES NFR BLD AUTO: 6.6 % (ref 0–5)
NEUTROPHILS # BLD AUTO: 4.5 K/MM3 (ref 1.8–7.7)
NEUTROPHILS NFR BLD AUTO: 65.6 % (ref 36–66)
PLATELET # BLD AUTO: 200 K/MM3 (ref 150–450)
POTASSIUM SERPL-SCNC: 4.1 MEQ/L (ref 3.5–5.1)
PROT SERPL-MCNC: 6.8 GM/DL (ref 6.4–8.2)
SODIUM SERPL-SCNC: 142 MEQ/L (ref 136–145)
WBC # BLD AUTO: 6.9 K/MM3 (ref 4–10)

## 2017-05-18 PROCEDURE — 99283 EMERGENCY DEPT VISIT LOW MDM: CPT

## 2017-05-18 PROCEDURE — 76830 TRANSVAGINAL US NON-OB: CPT

## 2017-05-18 PROCEDURE — 96374 THER/PROPH/DIAG INJ IV PUSH: CPT

## 2017-05-18 PROCEDURE — 80048 BASIC METABOLIC PNL TOTAL CA: CPT

## 2017-05-18 PROCEDURE — 74177 CT ABD & PELVIS W/CONTRAST: CPT

## 2017-05-18 PROCEDURE — 85025 COMPLETE CBC W/AUTO DIFF WBC: CPT

## 2017-05-18 PROCEDURE — 84703 CHORIONIC GONADOTROPIN ASSAY: CPT

## 2017-05-18 PROCEDURE — 87491 CHLMYD TRACH DNA AMP PROBE: CPT

## 2017-05-18 PROCEDURE — 80076 HEPATIC FUNCTION PANEL: CPT

## 2017-05-18 PROCEDURE — 81001 URINALYSIS AUTO W/SCOPE: CPT

## 2017-05-18 PROCEDURE — 76856 US EXAM PELVIC COMPLETE: CPT

## 2017-05-18 PROCEDURE — 82150 ASSAY OF AMYLASE: CPT

## 2017-05-18 PROCEDURE — 96375 TX/PRO/DX INJ NEW DRUG ADDON: CPT

## 2017-05-18 PROCEDURE — 93976 VASCULAR STUDY: CPT

## 2017-05-18 PROCEDURE — 96376 TX/PRO/DX INJ SAME DRUG ADON: CPT

## 2017-05-18 PROCEDURE — 87591 N.GONORRHOEAE DNA AMP PROB: CPT

## 2017-05-18 PROCEDURE — 83690 ASSAY OF LIPASE: CPT

## 2017-05-18 PROCEDURE — 87210 SMEAR WET MOUNT SALINE/INK: CPT

## 2017-05-18 NOTE — REP
Clinical:  Right pelvic pain.

 

Technique:  Transabdominal pelvic ultrasound followed by transvaginal examination

for better evaluation of the endometrium and adnexa with color Doppler evaluation

of the ovaries.

 

Findings:

 

Bladder is unremarkable and measures 6.1 x 3.3 x 2.9 cm .

 

Normal anteverted uterus measures 7.7 x 3.7 x 4.6 cm .  The endometrial complex

measures 9 mm thickness.  No discrete uterine or endometrial abnormalities are

appreciated.

 

Bilateral ovaries are normal in appearance and vascularity without evidence for

torsion.  Right ovary measures 3.1 x 2.7 x 2.7 cm ; R I = 0.33 .  Left ovary

measures 4.3 x 3.1 x 4.5 cm with 2.7 cm hemorrhagic cyst ; R I = 0.34 .

 

Trace pelvic free fluid is nonspecific and possibly related to menstrual cycle.

No adnexal mass lesion.

 

Impression:

1.  2.7 cm left hemorrhagic cyst.

2.  Otherwise normal pelvic ultrasound.

 

 

Signed by

Antoine Guzman MD 05/18/2017 04:24 P

## 2017-05-18 NOTE — REP
CT abdomen pelvis with IV contrast, without bowel contrast:

 

Comparison is the pelvic ultrasound performed earlier today.

 

The visualized lung fields are unremarkable.

 

The hepatic parenchyma, gallbladder, pancreas and spleen are normal size and

unremarkable.

 

The adrenals, kidneys and abdominal aorta are unremarkable.

 

There is no bowel distension.  Mesentery is unremarkable.

 

Pelvis:

 

The appendix has a normal appearance.  The a uterus is unremarkable.  There is a

left adnexal cyst measuring 3.8 cm by CT.  There is a small volume of ascites.

The bladder is unremarkable.  There is no adenopathy or free fluid.

 

Impression:

 

3.8 cm left adnexal cyst.  Small volume of ascites in the pelvis.

 

Otherwise, negative CT study of the abdomen and pelvis.

 

 

Signed by

Tommy Li MD 05/18/2017 05:45 P

## 2017-05-30 ENCOUNTER — HOSPITAL ENCOUNTER (EMERGENCY)
Dept: HOSPITAL 53 - M ED | Age: 22
Discharge: HOME | End: 2017-05-30
Payer: OTHER GOVERNMENT

## 2017-05-30 VITALS — DIASTOLIC BLOOD PRESSURE: 77 MMHG | SYSTOLIC BLOOD PRESSURE: 129 MMHG

## 2017-05-30 VITALS — HEIGHT: 68 IN | BODY MASS INDEX: 27.28 KG/M2 | WEIGHT: 180 LBS

## 2017-05-30 DIAGNOSIS — Z79.899: ICD-10-CM

## 2017-05-30 DIAGNOSIS — F17.200: ICD-10-CM

## 2017-05-30 DIAGNOSIS — Z79.51: ICD-10-CM

## 2017-05-30 DIAGNOSIS — R11.2: Primary | ICD-10-CM

## 2017-05-30 LAB
ALBUMIN SERPL BCG-MCNC: 3.5 GM/DL (ref 3.2–5.2)
ALBUMIN/GLOB SERPL: 0.95 {RATIO} (ref 1–1.93)
ALP SERPL-CCNC: 84 U/L (ref 45–117)
ALT SERPL W P-5'-P-CCNC: 23 U/L (ref 12–78)
ANION GAP SERPL CALC-SCNC: 7 MEQ/L (ref 8–16)
AST SERPL-CCNC: 14 U/L (ref 15–37)
BASOPHILS # BLD AUTO: 0.1 K/MM3 (ref 0–0.2)
BASOPHILS NFR BLD AUTO: 0.7 % (ref 0–1)
BILIRUB CONJ SERPL-MCNC: < 0.1 MG/DL (ref 0–0.2)
BILIRUB SERPL-MCNC: 0.2 MG/DL (ref 0.2–1)
BUN SERPL-MCNC: 8 MG/DL (ref 7–18)
CALCIUM SERPL-MCNC: 8.4 MG/DL (ref 8.5–10.1)
CHLORIDE SERPL-SCNC: 105 MEQ/L (ref 98–107)
CO2 SERPL-SCNC: 27 MEQ/L (ref 21–32)
CONTROL LINE HCG: (no result)
CREAT SERPL-MCNC: 0.64 MG/DL (ref 0.55–1.02)
EOSINOPHIL # BLD AUTO: 0.3 K/MM3 (ref 0–0.5)
EOSINOPHIL NFR BLD AUTO: 3.6 % (ref 0–3)
ERYTHROCYTE [DISTWIDTH] IN BLOOD BY AUTOMATED COUNT: 14.5 % (ref 11.5–14.5)
GLUCOSE SERPL-MCNC: 81 MG/DL (ref 70–105)
LARGE UNSTAINED CELL #: 0.1 K/MM3 (ref 0–0.4)
LARGE UNSTAINED CELL %: 1.1 % (ref 0–4)
LYMPHOCYTES # BLD AUTO: 2.2 K/MM3 (ref 1.5–6.5)
LYMPHOCYTES NFR BLD AUTO: 24.2 % (ref 24–44)
MCH RBC QN AUTO: 28.1 PG (ref 27–33)
MCHC RBC AUTO-ENTMCNC: 33.2 G/DL (ref 32–36.5)
MCV RBC AUTO: 84.7 FL (ref 80–96)
MONOCYTES # BLD AUTO: 0.4 K/MM3 (ref 0–0.8)
MONOCYTES NFR BLD AUTO: 4.5 % (ref 0–5)
NEUTROPHILS # BLD AUTO: 5.7 K/MM3 (ref 1.8–7.7)
NEUTROPHILS NFR BLD AUTO: 66 % (ref 36–66)
PLATELET # BLD AUTO: 260 K/MM3 (ref 150–450)
POTASSIUM SERPL-SCNC: 3.8 MEQ/L (ref 3.5–5.1)
PROT SERPL-MCNC: 7.2 GM/DL (ref 6.4–8.2)
SODIUM SERPL-SCNC: 139 MEQ/L (ref 136–145)
WBC # BLD AUTO: 8.6 K/MM3 (ref 4–10)

## 2017-05-30 NOTE — REP
Clinical:  Epigastric and abdominal pain.

 

Technique:  Upright view of the chest with supine and upright views of the

abdomen and pelvis.

 

Findings:  Frontal upright view of the chest demonstrates no acute

cardiopulmonary process or free air below the diaphragm to suspect

pneumoperitoneum.  Supine and upright views of the abdomen and pelvis demonstrate

nonspecific bowel gas pattern without obstruction or perforation.  No

organomegaly.  No abnormal calcifications.  Skeletal structures normal for age.

 

Impression:

Nonspecific bowel gas pattern.

 

 

Signed by

Antoine Guzman MD 05/30/2017 09:44 P

## 2017-06-01 ENCOUNTER — HOSPITAL ENCOUNTER (EMERGENCY)
Dept: HOSPITAL 53 - M ED | Age: 22
Discharge: HOME | End: 2017-06-01
Payer: OTHER GOVERNMENT

## 2017-06-01 VITALS — HEIGHT: 68 IN | BODY MASS INDEX: 28.04 KG/M2 | WEIGHT: 185 LBS

## 2017-06-01 VITALS — DIASTOLIC BLOOD PRESSURE: 74 MMHG | SYSTOLIC BLOOD PRESSURE: 118 MMHG

## 2017-06-01 DIAGNOSIS — Z79.899: ICD-10-CM

## 2017-06-01 DIAGNOSIS — F90.9: ICD-10-CM

## 2017-06-01 DIAGNOSIS — J45.909: ICD-10-CM

## 2017-06-01 DIAGNOSIS — F17.200: ICD-10-CM

## 2017-06-01 DIAGNOSIS — F41.9: ICD-10-CM

## 2017-06-01 DIAGNOSIS — R10.2: ICD-10-CM

## 2017-06-01 DIAGNOSIS — N39.0: Primary | ICD-10-CM

## 2017-06-01 DIAGNOSIS — Z79.51: ICD-10-CM

## 2017-06-01 DIAGNOSIS — F32.9: ICD-10-CM

## 2017-06-01 LAB
ANION GAP SERPL CALC-SCNC: 7 MEQ/L (ref 8–16)
BASOPHILS # BLD AUTO: 0 K/MM3 (ref 0–0.2)
BASOPHILS NFR BLD AUTO: 0.6 % (ref 0–1)
BUN SERPL-MCNC: 8 MG/DL (ref 7–18)
CALCIUM SERPL-MCNC: 8.7 MG/DL (ref 8.5–10.1)
CHLORIDE SERPL-SCNC: 104 MEQ/L (ref 98–107)
CO2 SERPL-SCNC: 28 MEQ/L (ref 21–32)
CONTROL LINE HCG: (no result)
CREAT SERPL-MCNC: 0.72 MG/DL (ref 0.55–1.02)
EOSINOPHIL # BLD AUTO: 0.3 K/MM3 (ref 0–0.5)
EOSINOPHIL NFR BLD AUTO: 3.5 % (ref 0–3)
ERYTHROCYTE [DISTWIDTH] IN BLOOD BY AUTOMATED COUNT: 14.2 % (ref 11.5–14.5)
GLUCOSE SERPL-MCNC: 87 MG/DL (ref 70–105)
LARGE UNSTAINED CELL #: 0.1 K/MM3 (ref 0–0.4)
LARGE UNSTAINED CELL %: 1.4 % (ref 0–4)
LYMPHOCYTES # BLD AUTO: 2.4 K/MM3 (ref 1.5–6.5)
LYMPHOCYTES NFR BLD AUTO: 29.6 % (ref 24–44)
MCH RBC QN AUTO: 28.6 PG (ref 27–33)
MCHC RBC AUTO-ENTMCNC: 33.4 G/DL (ref 32–36.5)
MCV RBC AUTO: 85.7 FL (ref 80–96)
MONOCYTES # BLD AUTO: 0.4 K/MM3 (ref 0–0.8)
MONOCYTES NFR BLD AUTO: 5.2 % (ref 0–5)
NEUTROPHILS # BLD AUTO: 4.8 K/MM3 (ref 1.8–7.7)
NEUTROPHILS NFR BLD AUTO: 59.7 % (ref 36–66)
PLATELET # BLD AUTO: 293 K/MM3 (ref 150–450)
POTASSIUM SERPL-SCNC: 4 MEQ/L (ref 3.5–5.1)
SODIUM SERPL-SCNC: 139 MEQ/L (ref 136–145)
WBC # BLD AUTO: 8 K/MM3 (ref 4–10)

## 2017-06-01 NOTE — REPUSA
Clinical history: ovarian cyst.

Findings: Real-time transabdominal and transvaginal ultrasound images of the pelvis were obtained. An
 anteverted uterus is noted, measuring  6.6 x 3.2 x 4.4 cm. The uterus demonstrates normal echotextur
e and echogenicity. The endometrial stripe measures   6 mm and is within normal limits. The right ova
ry measures 63.2 x 2.3 x 2.0 cm. The left ovary measures  2.8 x 1.8 x 2.2 cm. No adnexal masses are s
een. Color Doppler flow is seen within both ovaries. There is  small amount of free fluid.

Impression: Unremarkable ultrasound examination of the pelvis.

     Electronically signed by ISABEL ALVAREZ MD on 06/01/2017 09:30:10 PM ET

## 2017-06-15 ENCOUNTER — HOSPITAL ENCOUNTER (EMERGENCY)
Dept: HOSPITAL 53 - M ED | Age: 22
Discharge: HOME | End: 2017-06-15
Payer: OTHER GOVERNMENT

## 2017-06-15 VITALS — WEIGHT: 191.8 LBS | BODY MASS INDEX: 29.07 KG/M2 | HEIGHT: 68 IN

## 2017-06-15 VITALS — SYSTOLIC BLOOD PRESSURE: 127 MMHG | DIASTOLIC BLOOD PRESSURE: 71 MMHG

## 2017-06-15 DIAGNOSIS — R11.2: Primary | ICD-10-CM

## 2017-06-15 DIAGNOSIS — R10.2: ICD-10-CM

## 2017-06-15 DIAGNOSIS — N39.0: ICD-10-CM

## 2017-06-15 DIAGNOSIS — F41.9: ICD-10-CM

## 2017-06-15 DIAGNOSIS — Z79.899: ICD-10-CM

## 2017-06-15 DIAGNOSIS — F90.9: ICD-10-CM

## 2017-06-15 DIAGNOSIS — F32.9: ICD-10-CM

## 2017-06-15 DIAGNOSIS — F17.200: ICD-10-CM

## 2017-06-15 DIAGNOSIS — R51: ICD-10-CM

## 2017-06-15 DIAGNOSIS — J45.909: ICD-10-CM

## 2017-06-15 LAB
ALBUMIN SERPL BCG-MCNC: 3.6 GM/DL (ref 3.2–5.2)
ALBUMIN/GLOB SERPL: 1 {RATIO} (ref 1–1.93)
ALP SERPL-CCNC: 94 U/L (ref 45–117)
ALT SERPL W P-5'-P-CCNC: 17 U/L (ref 12–78)
ANION GAP SERPL CALC-SCNC: 5 MEQ/L (ref 8–16)
AST SERPL-CCNC: 7 U/L (ref 15–37)
BASOPHILS # BLD AUTO: 0 K/MM3 (ref 0–0.2)
BASOPHILS NFR BLD AUTO: 0.8 % (ref 0–1)
BILIRUB CONJ SERPL-MCNC: < 0.1 MG/DL (ref 0–0.2)
BILIRUB SERPL-MCNC: 0.3 MG/DL (ref 0.2–1)
BUN SERPL-MCNC: 9 MG/DL (ref 7–18)
CALCIUM SERPL-MCNC: 9 MG/DL (ref 8.5–10.1)
CHLORIDE SERPL-SCNC: 107 MEQ/L (ref 98–107)
CO2 SERPL-SCNC: 29 MEQ/L (ref 21–32)
CONTROL LINE HCG: (no result)
CREAT SERPL-MCNC: 0.67 MG/DL (ref 0.55–1.02)
EOSINOPHIL # BLD AUTO: 0.2 K/MM3 (ref 0–0.5)
EOSINOPHIL NFR BLD AUTO: 4.2 % (ref 0–3)
ERYTHROCYTE [DISTWIDTH] IN BLOOD BY AUTOMATED COUNT: 13.9 % (ref 11.5–14.5)
GLUCOSE SERPL-MCNC: 79 MG/DL (ref 70–105)
LARGE UNSTAINED CELL #: 0.1 K/MM3 (ref 0–0.4)
LARGE UNSTAINED CELL %: 1.9 % (ref 0–4)
LYMPHOCYTES # BLD AUTO: 1.9 K/MM3 (ref 1.5–6.5)
LYMPHOCYTES NFR BLD AUTO: 29.4 % (ref 24–44)
MCH RBC QN AUTO: 27.6 PG (ref 27–33)
MCHC RBC AUTO-ENTMCNC: 32.8 G/DL (ref 32–36.5)
MCV RBC AUTO: 84.1 FL (ref 80–96)
MONOCYTES # BLD AUTO: 0.3 K/MM3 (ref 0–0.8)
MONOCYTES NFR BLD AUTO: 5.7 % (ref 0–5)
NEUTROPHILS # BLD AUTO: 3.5 K/MM3 (ref 1.8–7.7)
NEUTROPHILS NFR BLD AUTO: 58.1 % (ref 36–66)
PLATELET # BLD AUTO: 231 K/MM3 (ref 150–450)
POTASSIUM SERPL-SCNC: 4.2 MEQ/L (ref 3.5–5.1)
PROT SERPL-MCNC: 7.2 GM/DL (ref 6.4–8.2)
SODIUM SERPL-SCNC: 141 MEQ/L (ref 136–145)
WBC # BLD AUTO: 6 K/MM3 (ref 4–10)

## 2017-06-15 PROCEDURE — 80048 BASIC METABOLIC PNL TOTAL CA: CPT

## 2017-06-15 PROCEDURE — 76856 US EXAM PELVIC COMPLETE: CPT

## 2017-06-15 PROCEDURE — 96361 HYDRATE IV INFUSION ADD-ON: CPT

## 2017-06-15 PROCEDURE — 93976 VASCULAR STUDY: CPT

## 2017-06-15 PROCEDURE — 99284 EMERGENCY DEPT VISIT MOD MDM: CPT

## 2017-06-15 PROCEDURE — 87088 URINE BACTERIA CULTURE: CPT

## 2017-06-15 PROCEDURE — 85025 COMPLETE CBC W/AUTO DIFF WBC: CPT

## 2017-06-15 PROCEDURE — 80076 HEPATIC FUNCTION PANEL: CPT

## 2017-06-15 PROCEDURE — 96375 TX/PRO/DX INJ NEW DRUG ADDON: CPT

## 2017-06-15 PROCEDURE — 96374 THER/PROPH/DIAG INJ IV PUSH: CPT

## 2017-06-15 PROCEDURE — 84703 CHORIONIC GONADOTROPIN ASSAY: CPT

## 2017-06-15 PROCEDURE — 76830 TRANSVAGINAL US NON-OB: CPT

## 2017-06-15 PROCEDURE — 81001 URINALYSIS AUTO W/SCOPE: CPT

## 2017-06-15 PROCEDURE — 81025 URINE PREGNANCY TEST: CPT

## 2017-06-15 NOTE — REP
PELVIC ULTRASOUND:

 

Real-time sonographic evaluation of the pelvis is performed utilizing

transabdominal and endovaginal technique.

 

Comparison is made with a prior study of 06/01/2017.

 

The urinary bladder measures 5.6 x 3.0 x 6.8 cm.  The uterus measures 6.8 x 3.2 x

3.6 cm. Endometrial thickness is 10 mm with endometrial fluid collection.  The

ovaries are normal in size and echotexture, right ovary measuring 3.5 x 2.6 x 3.0

cm. And the left ovary 3.4 x 2.3 x 3.2 cm.  There is no evidence of adnexal mass

or free fluid.  There is no evidence of ovarian torsion, with blood flow seen in

each ovary with duplex Doppler evaluation, RI right ovary 0.50 and left ovary

0.54.

 

IMPRESSION:

 

Negative pelvic ultrasound.

 

 

Signed by

Tommy Arana MD 06/15/2017 04:30 P

## 2017-06-22 ENCOUNTER — HOSPITAL ENCOUNTER (EMERGENCY)
Dept: HOSPITAL 53 - M ED | Age: 22
Discharge: HOME | End: 2017-06-22
Payer: OTHER GOVERNMENT

## 2017-06-22 VITALS — DIASTOLIC BLOOD PRESSURE: 78 MMHG | SYSTOLIC BLOOD PRESSURE: 117 MMHG

## 2017-06-22 VITALS — WEIGHT: 190.7 LBS | BODY MASS INDEX: 28.9 KG/M2 | HEIGHT: 68 IN

## 2017-06-22 DIAGNOSIS — Z79.899: ICD-10-CM

## 2017-06-22 DIAGNOSIS — J45.909: ICD-10-CM

## 2017-06-22 DIAGNOSIS — J04.0: Primary | ICD-10-CM

## 2017-06-22 DIAGNOSIS — F17.200: ICD-10-CM

## 2017-06-26 ENCOUNTER — HOSPITAL ENCOUNTER (EMERGENCY)
Dept: HOSPITAL 53 - M ED | Age: 22
Discharge: HOME | End: 2017-06-26
Payer: OTHER GOVERNMENT

## 2017-06-26 VITALS
SYSTOLIC BLOOD PRESSURE: 126 MMHG | WEIGHT: 190.92 LBS | DIASTOLIC BLOOD PRESSURE: 68 MMHG | BODY MASS INDEX: 28.94 KG/M2 | HEIGHT: 68 IN

## 2017-06-26 DIAGNOSIS — Z79.51: ICD-10-CM

## 2017-06-26 DIAGNOSIS — J06.9: Primary | ICD-10-CM

## 2017-06-26 DIAGNOSIS — F17.210: ICD-10-CM

## 2017-06-26 DIAGNOSIS — J45.909: ICD-10-CM

## 2017-06-26 DIAGNOSIS — Z79.899: ICD-10-CM

## 2017-07-12 ENCOUNTER — HOSPITAL ENCOUNTER (EMERGENCY)
Dept: HOSPITAL 53 - M ED | Age: 22
Discharge: HOME | End: 2017-07-12
Payer: OTHER GOVERNMENT

## 2017-07-12 VITALS — WEIGHT: 188.72 LBS | HEIGHT: 68 IN | BODY MASS INDEX: 28.6 KG/M2

## 2017-07-12 VITALS — SYSTOLIC BLOOD PRESSURE: 98 MMHG | DIASTOLIC BLOOD PRESSURE: 64 MMHG

## 2017-07-12 DIAGNOSIS — R11.2: Primary | ICD-10-CM

## 2017-07-12 DIAGNOSIS — Z79.899: ICD-10-CM

## 2017-07-12 DIAGNOSIS — E28.2: ICD-10-CM

## 2017-07-12 LAB
ANION GAP SERPL CALC-SCNC: 3 MEQ/L (ref 8–16)
BASOPHILS # BLD AUTO: 0.1 K/MM3 (ref 0–0.2)
BASOPHILS NFR BLD AUTO: 1.2 % (ref 0–1)
BUN SERPL-MCNC: 8 MG/DL (ref 7–18)
CALCIUM SERPL-MCNC: 9.1 MG/DL (ref 8.5–10.1)
CHLORIDE SERPL-SCNC: 108 MEQ/L (ref 98–107)
CO2 SERPL-SCNC: 27 MEQ/L (ref 21–32)
CONTROL LINE HCG: (no result)
CREAT SERPL-MCNC: 0.67 MG/DL (ref 0.55–1.02)
EOSINOPHIL # BLD AUTO: 0.3 K/MM3 (ref 0–0.5)
EOSINOPHIL NFR BLD AUTO: 6.2 % (ref 0–3)
ERYTHROCYTE [DISTWIDTH] IN BLOOD BY AUTOMATED COUNT: 13.9 % (ref 11.5–14.5)
GLUCOSE SERPL-MCNC: 81 MG/DL (ref 70–105)
LARGE UNSTAINED CELL #: 0.1 K/MM3 (ref 0–0.4)
LARGE UNSTAINED CELL %: 1.5 % (ref 0–4)
LYMPHOCYTES # BLD AUTO: 1.6 K/MM3 (ref 1.5–6.5)
LYMPHOCYTES NFR BLD AUTO: 28.2 % (ref 24–44)
MCH RBC QN AUTO: 28.1 PG (ref 27–33)
MCHC RBC AUTO-ENTMCNC: 33.7 G/DL (ref 32–36.5)
MCV RBC AUTO: 83.4 FL (ref 80–96)
MONOCYTES # BLD AUTO: 0.3 K/MM3 (ref 0–0.8)
MONOCYTES NFR BLD AUTO: 5.3 % (ref 0–5)
NEUTROPHILS # BLD AUTO: 3.2 K/MM3 (ref 1.8–7.7)
NEUTROPHILS NFR BLD AUTO: 57.6 % (ref 36–66)
PLATELET # BLD AUTO: 208 K/MM3 (ref 150–450)
POTASSIUM SERPL-SCNC: 3.9 MEQ/L (ref 3.5–5.1)
SODIUM SERPL-SCNC: 138 MEQ/L (ref 136–145)
WBC # BLD AUTO: 5.6 K/MM3 (ref 4–10)

## 2017-07-12 PROCEDURE — 99282 EMERGENCY DEPT VISIT SF MDM: CPT

## 2017-07-12 PROCEDURE — 80048 BASIC METABOLIC PNL TOTAL CA: CPT

## 2017-07-12 PROCEDURE — 96374 THER/PROPH/DIAG INJ IV PUSH: CPT

## 2017-07-12 PROCEDURE — 84703 CHORIONIC GONADOTROPIN ASSAY: CPT

## 2017-07-12 PROCEDURE — 85025 COMPLETE CBC W/AUTO DIFF WBC: CPT

## 2017-07-19 ENCOUNTER — HOSPITAL ENCOUNTER (EMERGENCY)
Dept: HOSPITAL 53 - M ED | Age: 22
LOS: 1 days | Discharge: LEFT BEFORE DISCHARGE | End: 2017-07-20
Payer: OTHER GOVERNMENT

## 2017-07-19 VITALS — WEIGHT: 186.07 LBS | BODY MASS INDEX: 28.2 KG/M2 | HEIGHT: 68 IN

## 2017-07-19 DIAGNOSIS — Z53.21: ICD-10-CM

## 2017-07-19 DIAGNOSIS — F17.200: ICD-10-CM

## 2017-07-19 DIAGNOSIS — Z79.899: ICD-10-CM

## 2017-07-19 DIAGNOSIS — Z71.1: Primary | ICD-10-CM

## 2017-07-19 PROCEDURE — 93041 RHYTHM ECG TRACING: CPT

## 2017-07-19 PROCEDURE — 86901 BLOOD TYPING SEROLOGIC RH(D): CPT

## 2017-07-19 PROCEDURE — 86850 RBC ANTIBODY SCREEN: CPT

## 2017-07-19 PROCEDURE — 80048 BASIC METABOLIC PNL TOTAL CA: CPT

## 2017-07-19 PROCEDURE — 85610 PROTHROMBIN TIME: CPT

## 2017-07-19 PROCEDURE — 85730 THROMBOPLASTIN TIME PARTIAL: CPT

## 2017-07-19 PROCEDURE — 85025 COMPLETE CBC W/AUTO DIFF WBC: CPT

## 2017-07-19 PROCEDURE — 96360 HYDRATION IV INFUSION INIT: CPT

## 2017-07-19 PROCEDURE — 80076 HEPATIC FUNCTION PANEL: CPT

## 2017-07-19 PROCEDURE — 84703 CHORIONIC GONADOTROPIN ASSAY: CPT

## 2017-07-19 PROCEDURE — 74177 CT ABD & PELVIS W/CONTRAST: CPT

## 2017-07-19 PROCEDURE — 83690 ASSAY OF LIPASE: CPT

## 2017-07-19 PROCEDURE — 86900 BLOOD TYPING SEROLOGIC ABO: CPT

## 2017-07-19 PROCEDURE — 99284 EMERGENCY DEPT VISIT MOD MDM: CPT

## 2017-07-19 PROCEDURE — 96361 HYDRATE IV INFUSION ADD-ON: CPT

## 2017-07-20 VITALS — DIASTOLIC BLOOD PRESSURE: 70 MMHG | SYSTOLIC BLOOD PRESSURE: 117 MMHG

## 2017-07-20 LAB
ALBUMIN SERPL BCG-MCNC: 3.7 GM/DL (ref 3.2–5.2)
ALBUMIN/GLOB SERPL: 1.09 {RATIO} (ref 1–1.93)
ALP SERPL-CCNC: 91 U/L (ref 45–117)
ALT SERPL W P-5'-P-CCNC: 17 U/L (ref 12–78)
ANION GAP SERPL CALC-SCNC: 8 MEQ/L (ref 8–16)
AST SERPL-CCNC: 11 U/L (ref 15–37)
BASOPHILS # BLD AUTO: 0.1 K/MM3 (ref 0–0.2)
BASOPHILS NFR BLD AUTO: 0.7 % (ref 0–1)
BILIRUB CONJ SERPL-MCNC: 0.1 MG/DL (ref 0–0.2)
BILIRUB SERPL-MCNC: 0.6 MG/DL (ref 0.2–1)
BUN SERPL-MCNC: 7 MG/DL (ref 7–18)
CALCIUM SERPL-MCNC: 9 MG/DL (ref 8.5–10.1)
CHLORIDE SERPL-SCNC: 104 MEQ/L (ref 98–107)
CO2 SERPL-SCNC: 26 MEQ/L (ref 21–32)
CONTROL LINE HCG: (no result)
CREAT SERPL-MCNC: 0.65 MG/DL (ref 0.55–1.02)
EOSINOPHIL # BLD AUTO: 0.4 K/MM3 (ref 0–0.5)
EOSINOPHIL NFR BLD AUTO: 5.2 % (ref 0–3)
ERYTHROCYTE [DISTWIDTH] IN BLOOD BY AUTOMATED COUNT: 14.1 % (ref 11.5–14.5)
GLUCOSE SERPL-MCNC: 79 MG/DL (ref 70–105)
INR PPP: 1.07
LARGE UNSTAINED CELL #: 0.1 K/MM3 (ref 0–0.4)
LARGE UNSTAINED CELL %: 1.4 % (ref 0–4)
LYMPHOCYTES # BLD AUTO: 2.7 K/MM3 (ref 1.5–6.5)
LYMPHOCYTES NFR BLD AUTO: 30.1 % (ref 24–44)
MCH RBC QN AUTO: 28.4 PG (ref 27–33)
MCHC RBC AUTO-ENTMCNC: 33.9 G/DL (ref 32–36.5)
MCV RBC AUTO: 83.7 FL (ref 80–96)
MONOCYTES # BLD AUTO: 0.5 K/MM3 (ref 0–0.8)
MONOCYTES NFR BLD AUTO: 5.3 % (ref 0–5)
NEUTROPHILS # BLD AUTO: 5 K/MM3 (ref 1.8–7.7)
NEUTROPHILS NFR BLD AUTO: 57.4 % (ref 36–66)
PLATELET # BLD AUTO: 227 K/MM3 (ref 150–450)
POTASSIUM SERPL-SCNC: 3.6 MEQ/L (ref 3.5–5.1)
PROT SERPL-MCNC: 7.1 GM/DL (ref 6.4–8.2)
SODIUM SERPL-SCNC: 138 MEQ/L (ref 136–145)
WBC # BLD AUTO: 8.6 K/MM3 (ref 4–10)

## 2017-07-20 NOTE — REPUSA
CLINICAL HISTORY: Abdominal pain.

TECHNIQUE: Multiple axial, sagittal and coronal CT images were obtained through the abdomen and pelvi
s after administration of intravenous contrast material.

COMMENTS:

Comparison is made to prior exam performed on 5/18/2017.

Moderate diffuse thickening of the wall of the colon.

The liver is of decreased attenuation without mass or defect. There is no intra or extrahepatic bilia
ry ductal dilatation. The spleen is normal. The gallbladder is within normal limits. The pancreas is 
of normal contour and attenuation characteristics. There is no evidence of adrenal mass.

Both kidneys demonstrate prompt and equal nephrograms. The kidneys are normal in size, shape and conf
iguration. There is no evidence of renal or ureteral mass. No renal or ureteral calculi are identifie
d. There is no hydroureter or hydronephrosis.

No evidence for appendicitis.  There is no bowel wall thickening. No evidence for small or large milton
l obstruction. There is no evidence of abdominal ascites or lymphadenopathy.

There is no evidence of intrinsic or extrinsic bladder mass. Unchanged mild diffuse thickening of the
 wall of the bladder. Unchanged small amount of free pelvic fluid.

Images of the lung bases show no evidence of pleural or parenchymal mass. There are no pleural effusi
ons.

The bony structures are free of lytic or blastic lesions.

Small sliding hiatal hernia.

IMPRESSION:

Mild fatty infiltration.

Small sliding hiatal hernia.

Diffuse thickening of the colon. Infectious/inflammatory pathology is the main consideration.

Resolution of left ovarian cyst.

Unchanged mild diffuse thickening of the wall of the bladder.

Unchanged small amount of free pelvic fluid.

Thank you for your kind referral of this patient.

     Electronically signed by NIKIA MONTES MD on 07/20/2017 07:10:10 AM ET

## 2017-07-21 NOTE — ED PDOC
Post-Departure Follow-Up


radiology report faxed to [facility]











Shanta Leo MD Jul 21, 2017 15:15

## 2017-07-31 ENCOUNTER — HOSPITAL ENCOUNTER (EMERGENCY)
Dept: HOSPITAL 53 - M ED | Age: 22
Discharge: HOME | End: 2017-07-31
Payer: OTHER GOVERNMENT

## 2017-07-31 VITALS — SYSTOLIC BLOOD PRESSURE: 123 MMHG | DIASTOLIC BLOOD PRESSURE: 63 MMHG

## 2017-07-31 VITALS — WEIGHT: 188.05 LBS | HEIGHT: 68 IN | BODY MASS INDEX: 28.5 KG/M2

## 2017-07-31 DIAGNOSIS — F32.9: ICD-10-CM

## 2017-07-31 DIAGNOSIS — F90.9: ICD-10-CM

## 2017-07-31 DIAGNOSIS — Z79.899: ICD-10-CM

## 2017-07-31 DIAGNOSIS — E28.2: ICD-10-CM

## 2017-07-31 DIAGNOSIS — F17.200: ICD-10-CM

## 2017-07-31 DIAGNOSIS — R11.2: Primary | ICD-10-CM

## 2017-07-31 DIAGNOSIS — F41.9: ICD-10-CM

## 2017-07-31 DIAGNOSIS — J45.909: ICD-10-CM

## 2017-09-05 ENCOUNTER — HOSPITAL ENCOUNTER (EMERGENCY)
Dept: HOSPITAL 53 - M ED | Age: 22
Discharge: HOME | End: 2017-09-05
Payer: OTHER GOVERNMENT

## 2017-09-05 VITALS — DIASTOLIC BLOOD PRESSURE: 89 MMHG | SYSTOLIC BLOOD PRESSURE: 141 MMHG

## 2017-09-05 VITALS — BODY MASS INDEX: 28.1 KG/M2 | HEIGHT: 68 IN | WEIGHT: 185.39 LBS

## 2017-09-05 DIAGNOSIS — N75.0: ICD-10-CM

## 2017-09-05 DIAGNOSIS — R55: Primary | ICD-10-CM

## 2017-09-05 LAB
ALBUMIN SERPL BCG-MCNC: 3.6 GM/DL (ref 3.2–5.2)
ALBUMIN/GLOB SERPL: 0.8 {RATIO} (ref 1–1.93)
ALP SERPL-CCNC: 105 U/L (ref 45–117)
ALT SERPL W P-5'-P-CCNC: 34 U/L (ref 12–78)
ANION GAP SERPL CALC-SCNC: 11 MEQ/L (ref 8–16)
AST SERPL-CCNC: 19 U/L (ref 15–37)
BASOPHILS # BLD AUTO: 0 K/MM3 (ref 0–0.2)
BASOPHILS NFR BLD AUTO: 0.4 % (ref 0–1)
BILIRUB SERPL-MCNC: 0.7 MG/DL (ref 0.2–1)
BUN SERPL-MCNC: 7 MG/DL (ref 7–18)
CALCIUM SERPL-MCNC: 9 MG/DL (ref 8.5–10.1)
CHLORIDE SERPL-SCNC: 105 MEQ/L (ref 98–107)
CO2 SERPL-SCNC: 24 MEQ/L (ref 21–32)
CONTROL LINE UCG: (no result)
CREAT SERPL-MCNC: 0.68 MG/DL (ref 0.55–1.02)
EOSINOPHIL # BLD AUTO: 0.3 K/MM3 (ref 0–0.5)
EOSINOPHIL NFR BLD AUTO: 2.8 % (ref 0–3)
ERYTHROCYTE [DISTWIDTH] IN BLOOD BY AUTOMATED COUNT: 14.2 % (ref 11.5–14.5)
GLUCOSE SERPL-MCNC: 85 MG/DL (ref 70–105)
LARGE UNSTAINED CELL #: 0.1 K/MM3 (ref 0–0.4)
LARGE UNSTAINED CELL %: 1.4 % (ref 0–4)
LYMPHOCYTES # BLD AUTO: 1.8 K/MM3 (ref 1.5–6.5)
LYMPHOCYTES NFR BLD AUTO: 18 % (ref 24–44)
MCH RBC QN AUTO: 28.6 PG (ref 27–33)
MCHC RBC AUTO-ENTMCNC: 34.2 G/DL (ref 32–36.5)
MCV RBC AUTO: 83.6 FL (ref 80–96)
MONOCYTES # BLD AUTO: 0.6 K/MM3 (ref 0–0.8)
MONOCYTES NFR BLD AUTO: 5.7 % (ref 0–5)
NEUTROPHILS # BLD AUTO: 7 K/MM3 (ref 1.8–7.7)
NEUTROPHILS NFR BLD AUTO: 71.7 % (ref 36–66)
PLATELET # BLD AUTO: 247 K/MM3 (ref 150–450)
POTASSIUM SERPL-SCNC: 3.8 MEQ/L (ref 3.5–5.1)
PROT SERPL-MCNC: 8.1 GM/DL (ref 6.4–8.2)
SODIUM SERPL-SCNC: 140 MEQ/L (ref 136–145)
WBC # BLD AUTO: 9.7 K/MM3 (ref 4–10)

## 2017-09-05 PROCEDURE — 83690 ASSAY OF LIPASE: CPT

## 2017-09-05 PROCEDURE — 85025 COMPLETE CBC W/AUTO DIFF WBC: CPT

## 2017-09-05 PROCEDURE — 87491 CHLMYD TRACH DNA AMP PROBE: CPT

## 2017-09-05 PROCEDURE — 87591 N.GONORRHOEAE DNA AMP PROB: CPT

## 2017-09-05 PROCEDURE — 96374 THER/PROPH/DIAG INJ IV PUSH: CPT

## 2017-09-05 PROCEDURE — 99284 EMERGENCY DEPT VISIT MOD MDM: CPT

## 2017-09-05 PROCEDURE — 80053 COMPREHEN METABOLIC PANEL: CPT

## 2017-09-05 PROCEDURE — 84703 CHORIONIC GONADOTROPIN ASSAY: CPT

## 2017-09-05 PROCEDURE — 74177 CT ABD & PELVIS W/CONTRAST: CPT

## 2017-09-05 PROCEDURE — 93005 ELECTROCARDIOGRAM TRACING: CPT

## 2017-09-05 PROCEDURE — 81001 URINALYSIS AUTO W/SCOPE: CPT

## 2017-09-05 NOTE — REP
Clinical:  Right lower quadrant pain.

 

Technique:  Axial contrast enhanced images from the lung bases to the pubic

symphysis using 100 ml Isovue 370 intravenous contrast material with coronal and

sagittal re-formations.

 

Comparison:  07/20/2017.

 

Findings:

Lung bases are clear.  Visualized heart and pericardium normal.

 

Liver, spleen, pancreas, gallbladder, bilateral adrenal glands and kidneys are

normal.  The enteric system is without obstruction or acute inflammatory process.

Normal terminal ileum and appendix are identified in the right lower quadrant.

Few mildly prominent lymph nodes in the right lower quadrant/pericecal region are

nonspecific but may reflect mesenteric adenitis.  Pelvis demonstrates normal

bladder and uterus/adnexa.  Small amount of free fluid is nonspecific and

possibly physiologic.  No free air.  No retroperitoneal adenopathy.  No mass

lesion.  Vasculature appears normal.  Musculoskeletal structures are intact.

 

Impression:

1.  Few prominent lymph nodes in the right lower quadrant/pericecal region may

reflect mesenteric adenitis.

2.  Small amount of free fluid in the pelvis is nonspecific and possibly

physiologic/related to menstrual cycle.

3.  No further acute abdominopelvic pathology appreciated.  Normal terminal ileum

and appendix identified in the right lower quadrant.

 

 

Signed by

Antoine Guzman MD 09/05/2017 11:38 A

## 2017-09-05 NOTE — ECGEPIP
Stationary ECG Study

                           [facility] - ED

                                       

                                       Test Date:    2017

Pat Name:     GALINA RAMIREZ               Department:   

Patient ID:   [id number]                 Room:         -

Gender:       F                        Technician:   

:          1995               Requested By: Quincy LEARY

Order Number: [id number]     Reading MD:   Shanta Leo

                                 Measurements

Intervals                              Axis          

Rate:         74                       P:            28

TX:           128                      QRS:          52

QRSD:         90                       T:            43

QT:           390                                    

QTc:          433                                    

                           Interpretive Statements

SINUS RHYTHM

NO PRIOR FOR COMPARISON

Electronically Signed On 2017 21:15:05 EDT by Shanta Leo

## 2017-12-04 ENCOUNTER — HOSPITAL ENCOUNTER (EMERGENCY)
Dept: HOSPITAL 53 - M ED | Age: 22
LOS: 1 days | Discharge: ANOTHER HOSPITAL | End: 2017-12-05
Payer: OTHER GOVERNMENT

## 2017-12-04 VITALS — HEIGHT: 68 IN | WEIGHT: 171.08 LBS | BODY MASS INDEX: 25.93 KG/M2

## 2017-12-04 DIAGNOSIS — J45.909: ICD-10-CM

## 2017-12-04 DIAGNOSIS — Z79.899: ICD-10-CM

## 2017-12-04 DIAGNOSIS — F17.200: ICD-10-CM

## 2017-12-04 DIAGNOSIS — R45.851: Primary | ICD-10-CM

## 2017-12-04 DIAGNOSIS — K21.9: ICD-10-CM

## 2017-12-04 DIAGNOSIS — F32.9: ICD-10-CM

## 2017-12-04 LAB
ALBUMIN SERPL BCG-MCNC: 3.6 GM/DL (ref 3.2–5.2)
ALBUMIN/GLOB SERPL: 1 {RATIO} (ref 1–1.93)
ALP SERPL-CCNC: 85 U/L (ref 45–117)
ALT SERPL W P-5'-P-CCNC: 20 U/L (ref 12–78)
ANION GAP SERPL CALC-SCNC: 8 MEQ/L (ref 8–16)
AST SERPL-CCNC: 12 U/L (ref 7–37)
BILIRUB CONJ SERPL-MCNC: 0.1 MG/DL (ref 0–0.2)
BILIRUB SERPL-MCNC: 0.6 MG/DL (ref 0.2–1)
BUN SERPL-MCNC: 6 MG/DL (ref 7–18)
CALCIUM SERPL-MCNC: 8.7 MG/DL (ref 8.5–10.1)
CHLORIDE SERPL-SCNC: 106 MEQ/L (ref 98–107)
CO2 SERPL-SCNC: 28 MEQ/L (ref 21–32)
CONTROL LINE HCG: (no result)
CREAT SERPL-MCNC: 0.63 MG/DL (ref 0.55–1.02)
ERYTHROCYTE [DISTWIDTH] IN BLOOD BY AUTOMATED COUNT: 13.7 % (ref 11.5–14.5)
GLUCOSE SERPL-MCNC: 82 MG/DL (ref 70–105)
MCH RBC QN AUTO: 28.3 PG (ref 27–33)
MCHC RBC AUTO-ENTMCNC: 34.2 G/DL (ref 32–36.5)
MCV RBC AUTO: 82.9 FL (ref 80–96)
NRBC BLD AUTO-RTO: 0 % (ref 0–0)
PLATELET # BLD AUTO: 235 10^3/UL (ref 150–450)
POTASSIUM SERPL-SCNC: 3.2 MEQ/L (ref 3.5–5.1)
PROT SERPL-MCNC: 7.2 GM/DL (ref 6.4–8.2)
SODIUM SERPL-SCNC: 142 MEQ/L (ref 136–145)
WBC # BLD AUTO: 7.2 10^3/UL (ref 4–10)

## 2017-12-04 PROCEDURE — 80076 HEPATIC FUNCTION PANEL: CPT

## 2017-12-04 PROCEDURE — 84703 CHORIONIC GONADOTROPIN ASSAY: CPT

## 2017-12-04 PROCEDURE — 93005 ELECTROCARDIOGRAM TRACING: CPT

## 2017-12-04 PROCEDURE — 85027 COMPLETE CBC AUTOMATED: CPT

## 2017-12-04 PROCEDURE — 80048 BASIC METABOLIC PNL TOTAL CA: CPT

## 2017-12-04 PROCEDURE — 84443 ASSAY THYROID STIM HORMONE: CPT

## 2017-12-04 PROCEDURE — 80307 DRUG TEST PRSMV CHEM ANLYZR: CPT

## 2017-12-04 PROCEDURE — 99285 EMERGENCY DEPT VISIT HI MDM: CPT

## 2017-12-04 PROCEDURE — 94640 AIRWAY INHALATION TREATMENT: CPT

## 2017-12-05 VITALS — DIASTOLIC BLOOD PRESSURE: 61 MMHG | SYSTOLIC BLOOD PRESSURE: 98 MMHG

## 2017-12-05 LAB — METHADONE UR QL SCN: NEGATIVE

## 2017-12-05 NOTE — ECGEPIP
Stationary ECG Study

                           [facility] - ED

                                       

                                       Test Date:    2017

Pat Name:     GALINA RAMIREZ               Department:   

Patient ID:   [id number]                 Room:         -

Gender:       F                        Technician:   

:          1995               Requested By: SYLVIA GRIFFITHS 

Order Number: [id number]     Reading MD:   Shanta Leo

                                 Measurements

Intervals                              Axis          

Rate:         78                       P:            56

ME:           164                      QRS:          70

QRSD:         89                       T:            67

QT:           393                                    

QTc:          449                                    

                           Interpretive Statements

SINUS RHYTHM WITH MARKED SINUS ARRHYTHMIA

SIMILAR 17

Electronically Signed On 2017 12:31:34 EST by Shanta Leo

## 2018-01-14 ENCOUNTER — HOSPITAL ENCOUNTER (EMERGENCY)
Dept: HOSPITAL 53 - M ED | Age: 23
Discharge: HOME | End: 2018-01-14
Payer: COMMERCIAL

## 2018-01-14 DIAGNOSIS — E28.2: ICD-10-CM

## 2018-01-14 DIAGNOSIS — F17.210: ICD-10-CM

## 2018-01-14 DIAGNOSIS — F31.9: ICD-10-CM

## 2018-01-14 DIAGNOSIS — J45.909: ICD-10-CM

## 2018-01-14 DIAGNOSIS — G43.109: Primary | ICD-10-CM

## 2018-01-14 DIAGNOSIS — F41.9: ICD-10-CM

## 2018-01-14 DIAGNOSIS — Z79.899: ICD-10-CM

## 2018-01-14 RX ADMIN — METOCLOPRAMIDE 1 MG: 5 INJECTION, SOLUTION INTRAMUSCULAR; INTRAVENOUS at 17:30

## 2018-01-14 RX ADMIN — ONDANSETRON 1 MG: 2 INJECTION INTRAMUSCULAR; INTRAVENOUS at 17:53

## 2018-01-14 RX ADMIN — KETOROLAC TROMETHAMINE 1 MG: 30 INJECTION, SOLUTION INTRAMUSCULAR at 17:50
